# Patient Record
Sex: MALE | Race: ASIAN | NOT HISPANIC OR LATINO | URBAN - METROPOLITAN AREA
[De-identification: names, ages, dates, MRNs, and addresses within clinical notes are randomized per-mention and may not be internally consistent; named-entity substitution may affect disease eponyms.]

---

## 2021-02-23 ENCOUNTER — EMERGENCY (EMERGENCY)
Facility: HOSPITAL | Age: 61
LOS: 1 days | Discharge: HOME | End: 2021-02-23
Attending: EMERGENCY MEDICINE | Admitting: EMERGENCY MEDICINE
Payer: COMMERCIAL

## 2021-02-23 VITALS
HEART RATE: 105 BPM | RESPIRATION RATE: 18 BRPM | OXYGEN SATURATION: 98 % | SYSTOLIC BLOOD PRESSURE: 173 MMHG | DIASTOLIC BLOOD PRESSURE: 90 MMHG | TEMPERATURE: 99 F

## 2021-02-23 PROCEDURE — 99283 EMERGENCY DEPT VISIT LOW MDM: CPT

## 2021-02-23 RX ORDER — ACETAMINOPHEN 500 MG
2 TABLET ORAL
Qty: 56 | Refills: 0
Start: 2021-02-23 | End: 2021-03-01

## 2021-02-23 RX ORDER — IBUPROFEN 200 MG
1 TABLET ORAL
Qty: 28 | Refills: 0
Start: 2021-02-23 | End: 2021-03-01

## 2021-02-23 NOTE — ED PROVIDER NOTE - NSFOLLOWUPINSTRUCTIONS_ED_ALL_ED_FT
1) Please follow-up with your primary care doctor in the next 2-3 days.  Please call tomorrow for an appointment.  If you cannot follow-up with your primary care doctor please return to the ED for any urgent issues. Please follow up with colorectal surgery in 5-7 days  2) You were given a copy of the tests performed today.  Please bring the results with you and review them with your primary care doctor.  3) If you have any worsening of symptoms or any other concerns please return to the ED immediately. Such as but not limited to uncontrolled pain, intractable vomiting, or fever > 100.4, pus drainages   4) Please continue taking your home medications as directed. Please take Motrin (Ibuprofen) 600mg by mouth every 6 hours as needed for pain. Please take this medication with food. Please take Tylenol (Acetaminophen) 1000 mg every 6 hours as needed for pain. Please do not exceed more than 4,000mg of Tylenol in a day. Please do Sitz baths 3x days and stool softeners.

## 2021-02-23 NOTE — ED PROVIDER NOTE - ATTENDING CONTRIBUTION TO CARE
Pt was seen and evaluated by me. Pt is a 61 y/o male with no PMH p/w rectal pain 2 days. Pt states that notice a rectal mass 1 month ago that disappeared in to rectum. Pt states that it returned 2 days ago with pain, no blood in stool. Pt denies any fever, chills, nausea, vomiting, SOB, chest pain, or abd pain. Lungs CTA b/l. RRR. Abd soft, non-tender. Pt noted to have hemorrhoid, thrombosed at 9 o'clock. No active bleeding.  Concern for thrombosed hemorrhoid  Excision of hemorrhoid

## 2021-02-23 NOTE — ED ADULT NURSE NOTE - OBJECTIVE STATEMENT
Pt presents to room 10, A&Ox3, ambulatory at baseline without assistance, no pmhx, here for evaluation of rectal pain x 1 week. pt denies any bleeding from the rectum. states pain is worse with movement and also with bowel movements. Denies any chest pain, dizziness, nausea, vomiting, shortness of breath, palpitations, diarrhea, fever, constipation, or chills. call bell in reach, side rails up, bed in locked position, md evaluation in progress, will continue to monitor.

## 2021-02-23 NOTE — ED PROVIDER NOTE - CLINICAL SUMMARY MEDICAL DECISION MAKING FREE TEXT BOX
61 y/o male with no PMH p/w rectal pain 2 days.   Concern for thrombosed hemorrhoid  Excision of hemorrhoid

## 2021-02-23 NOTE — ED PROVIDER NOTE - PATIENT PORTAL LINK FT
You can access the FollowMyHealth Patient Portal offered by [facility] by registering at the following website: [email or web address]. By joining [facility]’s FollowMyHealth portal, you will also be able to view your health information using other applications (apps) compatible with our system.

## 2021-02-23 NOTE — ED PROVIDER NOTE - OBJECTIVE STATEMENT
Katherine Gu MD: no PMH p/w rectal pain 2 days. Pt states that notice a rectal mass 1 month ago that disappeared in to rectum. Pt state that the return 2 days ago and is uncomfortable. Aggravated by BM. Denies blood in stools, abd pain, constipation diarrhea. 61 y/o male with no PMH p/w rectal pain 2 days. Pt states that notice a rectal mass 1 month ago that disappeared in to rectum. Pt state that the return 2 days ago and is uncomfortable. Aggravated by BM. Denies blood in stools, abd pain, constipation diarrhea.

## 2021-02-23 NOTE — ED PROVIDER NOTE - PROGRESS NOTE DETAILS
Dr. Burgess: Thrombosed hemorrhoid was incised and clots removed. Pt expressed relief of pain. Advised sitz baths along with plenty of fluids and fiber. Katherine Gu MD: Pt well appearing and asymptomatic. Pt is ambulatory and tolerating PO. Spoke with pt about return precautions. Pt agrees to follow up with their PCP. Pt ready for discharge
